# Patient Record
Sex: MALE | Race: WHITE
[De-identification: names, ages, dates, MRNs, and addresses within clinical notes are randomized per-mention and may not be internally consistent; named-entity substitution may affect disease eponyms.]

---

## 2021-05-12 ENCOUNTER — HOSPITAL ENCOUNTER (EMERGENCY)
Dept: HOSPITAL 53 - M ED | Age: 21
LOS: 1 days | Discharge: HOME | End: 2021-05-13
Payer: COMMERCIAL

## 2021-05-12 DIAGNOSIS — R55: Primary | ICD-10-CM

## 2021-05-12 DIAGNOSIS — I45.10: ICD-10-CM

## 2021-05-12 DIAGNOSIS — E86.0: ICD-10-CM

## 2021-05-12 LAB
BASOPHILS # BLD AUTO: 0.1 10^3/UL (ref 0–0.2)
BASOPHILS NFR BLD AUTO: 0.9 % (ref 0–1)
BUN SERPL-MCNC: 20 MG/DL (ref 7–18)
CALCIUM SERPL-MCNC: 9.2 MG/DL (ref 8.5–10.1)
CHLORIDE SERPL-SCNC: 109 MEQ/L (ref 98–107)
CK MB CFR.DF SERPL CALC: 0.48
CK MB SERPL-MCNC: 2.1 NG/ML (ref ?–3.6)
CK SERPL-CCNC: 441 U/L (ref 39–308)
CO2 SERPL-SCNC: 29 MEQ/L (ref 21–32)
CREAT SERPL-MCNC: 0.97 MG/DL (ref 0.7–1.3)
EOSINOPHIL # BLD AUTO: 0.1 10^3/UL (ref 0–0.5)
EOSINOPHIL NFR BLD AUTO: 0.9 % (ref 0–3)
GLUCOSE SERPL-MCNC: 83 MG/DL (ref 70–100)
HCT VFR BLD AUTO: 39.9 % (ref 42–52)
HGB BLD-MCNC: 13.1 G/DL (ref 13.5–17.5)
LYMPHOCYTES # BLD AUTO: 1.9 10^3/UL (ref 1.5–5)
LYMPHOCYTES NFR BLD AUTO: 26.4 % (ref 24–44)
MAGNESIUM SERPL-MCNC: 2.1 MG/DL (ref 1.4–2)
MCH RBC QN AUTO: 28.9 PG (ref 27–33)
MCHC RBC AUTO-ENTMCNC: 32.8 G/DL (ref 32–36.5)
MCV RBC AUTO: 87.9 FL (ref 80–96)
MONOCYTES # BLD AUTO: 0.4 10^3/UL (ref 0–0.8)
MONOCYTES NFR BLD AUTO: 6.3 % (ref 2–8)
NEUTROPHILS # BLD AUTO: 4.6 10^3/UL (ref 1.5–8.5)
NEUTROPHILS NFR BLD AUTO: 65.4 % (ref 36–66)
PLATELET # BLD AUTO: 207 10^3/UL (ref 150–450)
POTASSIUM SERPL-SCNC: 4.1 MEQ/L (ref 3.5–5.1)
RBC # BLD AUTO: 4.54 10^6/UL (ref 4.3–6.1)
SODIUM SERPL-SCNC: 141 MEQ/L (ref 136–145)
T4 FREE SERPL-MCNC: 0.94 NG/DL (ref 0.78–1.33)
TROPONIN I SERPL-MCNC: < 0.02 NG/ML (ref ?–0.1)
TSH SERPL DL<=0.005 MIU/L-ACNC: 1.3 UIU/ML (ref 0.46–3.98)
WBC # BLD AUTO: 7 10^3/UL (ref 4–10)

## 2021-05-13 VITALS — DIASTOLIC BLOOD PRESSURE: 72 MMHG | SYSTOLIC BLOOD PRESSURE: 136 MMHG

## 2021-05-13 NOTE — ECGEPIP
[facility] - ED

                                       

                                       Test Date:    2021

Pat Name:     JOVANI HERNANDEZ             Department:   

Patient ID:   [id number]                 Room:         -

Gender:       Male                     Technician:   

:          1999               Requested By: ARMANDO FRANCIS

Order Number: [id number]     Reading MD:   Jin Ying

                                 Measurements

Intervals                              Axis          

Rate:         96                       P:            73

NM:           146                      QRS:          82

QRSD:         106                      T:            58

QT:           348                                    

QTc:          439                                    

                           Interpretive Statements

Normal sinus rhythm

Incomplete right bundle branch block

NO PRIORS FOR COMPARISON

Electronically Signed on 2021 2:46:58 EDT by Jin Ying